# Patient Record
Sex: FEMALE | Race: BLACK OR AFRICAN AMERICAN
[De-identification: names, ages, dates, MRNs, and addresses within clinical notes are randomized per-mention and may not be internally consistent; named-entity substitution may affect disease eponyms.]

---

## 2017-07-21 ENCOUNTER — HOSPITAL ENCOUNTER (OUTPATIENT)
Dept: HOSPITAL 62 - WI | Age: 62
End: 2017-07-21
Attending: INTERNAL MEDICINE
Payer: MEDICARE

## 2017-07-21 DIAGNOSIS — Z12.31: Primary | ICD-10-CM

## 2017-07-21 PROCEDURE — G0202 SCR MAMMO BI INCL CAD: HCPCS

## 2017-07-21 PROCEDURE — 77067 SCR MAMMO BI INCL CAD: CPT

## 2017-07-21 NOTE — WOMENS IMAGING REPORT
EXAM DESCRIPTION:  BILAT SCREENING MAMMO W/CAD



COMPLETED DATE/TIME:  7/21/2017 2:13 pm



REASON FOR STUDY:  Z12.31, ROUTINE SCREENING MAMMO Z12.31  ENCNTR SCREEN MAMMOGRAM FOR MALIGNANT NEOP
LASM OF TERESE



COMPARISON:  October 2015 and August 2014



TECHNIQUE:  Standard craniocaudal and mediolateral oblique views of each breast recorded using digita
l acquisition.



LIMITATIONS:  None.



FINDINGS:  No masses, calcifications or architectural distortion. No areas of suspicion.

Read with the assistance of CAD.

[facility] Cenova Version 1.3

.[facility] - R2 Cenova Version 1.3

.[facility] - R2 Cenova Version 2.4

.[facility] - R2 Cenova Version 2.4

.[facility] - R2  Version 9.2



IMPRESSION:  NORMAL MAMMOGRAM.  BIRADS 1.



BREAST DENSITY:  a. The breasts are almost entirely fatty.



BIRAD:  1 NEGATIVE



RECOMMENDATION:  ROUTINE SCREENING



COMMENT:  The patient has been notified of the results by letter per MQSA requirements. Additional no
tification policies are in place for contacting patient with suspicious or incomplete findings.

Quality ID #225: The American College of Radiology recommends an annual screening mammogram for women
 aged 40 years or over. This facility utilizes a reminder system to ensure that all patients receive 
reminder letters, and/or direct phone calls for appointments. This includes reminders for routine scr
eening mammograms, diagnostic mammograms, or other Breast Imaging Interventions when appropriate.  Th
is patient will be placed in the appropriate reminder system.

The American College of Radiology (ACR) has developed recommendations for screening MRI of the breast
s in certain patient populations, to be used in conjunction with mammography.  Breast MRI surveillanc
e may be appropriate for women with more than 20% lifetime risk of developing breast cancer  as deter
mined by genetic testing, significant family history of the disease, or history of mantle radiation f
or Hodgkins Disease.  ACR Practice Guidelines 2008.



TECHNICAL DOCUMENTATION:  FINDING NUMBER: (1)

ASSESSMENT: (1)

JOB ID:  [id number]

 2011 [employer]- All Rights Reserved

## 2017-08-30 ENCOUNTER — HOSPITAL ENCOUNTER (OUTPATIENT)
Dept: HOSPITAL 62 - END | Age: 62
Discharge: HOME | End: 2017-08-30
Attending: SPECIALIST
Payer: MEDICARE

## 2017-08-30 VITALS — SYSTOLIC BLOOD PRESSURE: 142 MMHG | DIASTOLIC BLOOD PRESSURE: 86 MMHG

## 2017-08-30 DIAGNOSIS — K62.5: Primary | ICD-10-CM

## 2017-08-30 DIAGNOSIS — Z79.899: ICD-10-CM

## 2017-08-30 DIAGNOSIS — Z79.1: ICD-10-CM

## 2017-08-30 DIAGNOSIS — E66.01: ICD-10-CM

## 2017-08-30 DIAGNOSIS — J44.9: ICD-10-CM

## 2017-08-30 DIAGNOSIS — I10: ICD-10-CM

## 2017-08-30 DIAGNOSIS — Z88.5: ICD-10-CM

## 2017-08-30 DIAGNOSIS — D64.9: ICD-10-CM

## 2017-08-30 DIAGNOSIS — K57.30: ICD-10-CM

## 2017-08-30 LAB
BASOPHILS # BLD AUTO: 0.1 10^3/UL (ref 0–0.2)
BASOPHILS NFR BLD AUTO: 0.9 % (ref 0–2)
EOSINOPHIL # BLD AUTO: 0.1 10^3/UL (ref 0–0.6)
EOSINOPHIL NFR BLD AUTO: 2.4 % (ref 0–6)
ERYTHROCYTE [DISTWIDTH] IN BLOOD BY AUTOMATED COUNT: 15.6 % (ref 11.5–14)
FERRITIN SERPL-MCNC: 49.6 NG/ML (ref 11.1–264)
HCT VFR BLD CALC: 36.3 % (ref 36–47)
HGB BLD-MCNC: 12 G/DL (ref 12–15.5)
HGB HCT DIFFERENCE: -0.3
IRON SERPL-MCNC: 53 UG/DL (ref 37–170)
LYMPHOCYTES # BLD AUTO: 0.9 10^3/UL (ref 0.5–4.7)
LYMPHOCYTES NFR BLD AUTO: 16.1 % (ref 13–45)
MCH RBC QN AUTO: 28.8 PG (ref 27–33.4)
MCHC RBC AUTO-ENTMCNC: 33 G/DL (ref 32–36)
MCV RBC AUTO: 87 FL (ref 80–97)
MONOCYTES # BLD AUTO: 0.3 10^3/UL (ref 0.1–1.4)
MONOCYTES NFR BLD AUTO: 5.2 % (ref 3–13)
NEUTROPHILS # BLD AUTO: 4.2 10^3/UL (ref 1.7–8.2)
NEUTS SEG NFR BLD AUTO: 75.4 % (ref 42–78)
RBC # BLD AUTO: 4.15 10^6/UL (ref 3.72–5.28)
WBC # BLD AUTO: 5.5 10^3/UL (ref 4–10.5)

## 2017-08-30 PROCEDURE — 45378 DIAGNOSTIC COLONOSCOPY: CPT

## 2017-08-30 PROCEDURE — 36415 COLL VENOUS BLD VENIPUNCTURE: CPT

## 2017-08-30 PROCEDURE — 85025 COMPLETE CBC W/AUTO DIFF WBC: CPT

## 2017-08-30 PROCEDURE — 83540 ASSAY OF IRON: CPT

## 2017-08-30 PROCEDURE — 82728 ASSAY OF FERRITIN: CPT

## 2017-08-30 PROCEDURE — 0DJ08ZZ INSPECTION OF UPPER INTESTINAL TRACT, VIA NATURAL OR ARTIFICIAL OPENING ENDOSCOPIC: ICD-10-PCS | Performed by: SPECIALIST

## 2017-08-30 RX ADMIN — MIDAZOLAM HYDROCHLORIDE ONE MG: 1 INJECTION, SOLUTION INTRAMUSCULAR; INTRAVENOUS at 10:07

## 2017-08-30 RX ADMIN — FENTANYL CITRATE ONE MCG: 50 INJECTION INTRAMUSCULAR; INTRAVENOUS at 10:09

## 2017-08-30 RX ADMIN — FENTANYL CITRATE ONE MCG: 50 INJECTION INTRAMUSCULAR; INTRAVENOUS at 10:17

## 2017-08-30 RX ADMIN — MIDAZOLAM HYDROCHLORIDE ONE MG: 1 INJECTION, SOLUTION INTRAMUSCULAR; INTRAVENOUS at 10:24

## 2017-08-30 RX ADMIN — MIDAZOLAM HYDROCHLORIDE ONE MG: 1 INJECTION, SOLUTION INTRAMUSCULAR; INTRAVENOUS at 10:12

## 2017-08-30 RX ADMIN — MIDAZOLAM HYDROCHLORIDE ONE MG: 1 INJECTION, SOLUTION INTRAMUSCULAR; INTRAVENOUS at 10:15

## 2017-08-30 RX ADMIN — FENTANYL CITRATE ONE MCG: 50 INJECTION INTRAMUSCULAR; INTRAVENOUS at 10:13

## 2017-08-31 NOTE — OPERATIVE REPORT E
Operative Report



NAME: AIDA AMEZQUITA

MRN:  [id number]          : 1955 AGE:  61Y

DATE OF SURGERY: 2017              ROOM:



PREOPERATIVE DIAGNOSIS:

Rectal bleeding.



POSTOPERATIVE DIAGNOSIS:

Sigmoid and descending colon diverticulosis, mild.



PROCEDURE:

Colonoscopy.



SURGEON:

LOAN GRANDA M.D.



ANESTHESIA:

Versed 4 mg and Fentanyl 50 mcg.



TISSUE REMOVED OR ALTERED:

None.



PROCEDURE:

Rectal exam normal.  Sigmoid diverticulosis.  Descending colon

diverticulosis.  Transverse colon normal.  Ascending colon normal.  Cecum

normal.  Scope withdrawn from cecum, ascending, transverse, descending,

sigmoid all the way to the rectum.



CONCLUSIONS:

Sigmoid and descending colon diverticulosis.  No polyps.  No bleeding.



PLAN:

We will obtain baseline lab studies.  Consideration for follow-up

colonoscopy after 10 years.



DICTATING PHYSICIAN:  LOAN GRANDA M.D.





1209M                  DT: 2017    1055

PHY#: [id number]            DD: 2017    1039

ID:   [id number]           JOB#: [id number]       ACCT: [id number]



cc:LUIS GUERRERO M.D., MAHMOUD M.D.

>

## 2017-08-31 NOTE — HISTORY AND PHYSICAL E
History and Physical



NAME: PERSONAIDA

MRN:  [id number]             : 1955         AGE: 61Y

ADMITTED: 2017                      ROOM:

 



CHIEF COMPLAINT:

Blood in the stool, history of diverticulosis, history of benign polyp.



HISTORY:

We saw the patient in .  She did have diverticulosis and polyps. 

Because of rectal bleeding and strong family history of malignancy, the

patient is being admitted for colon exam.



PAST SURGICAL HISTORY:

1.  She did have cardiac cath.

2.  Cholecystectomy.



MEDICATIONS:

1.  Cymbalta.

2.  Celebrex.

3.  Clonidine.

4.  Simvastatin.

5.  Nexium.

6.  Diazepam.

7.  Losartan.

8.  Singulair.



PHYSICAL EXAMINATION:

GENERAL:  On exam, pleasant, alert, oriented, in no acute distress.



VITAL SIGNS:  Blood pressure is 120/80, pulse 80, respirations 20, temp is

98.



HEAD, EYES, EARS, NOSE, THROAT:  Normal.



ABDOMEN:  Soft.



NEUROLOGIC:  Exam negative.



CONCLUSION:

1.  Diverticulosis.

2.  Anemia.



PLAN:

Colon exam regarding rectal bleeding, diverticulosis.  Colonoscopy

scheduled .





DICTATING PHYSICIAN: LOAN GRANDA M.D.





1819M                  DT: 2017    1425

PHY#: [id number]            DD: 2017    1421

ID:   [id number]           JOB#: [id number]       ACCT: [id number]



cc:LOAN GRANDA M.D.

>

## 2017-08-31 NOTE — DISCHARGE SUMMARY E
Discharge Summary



NAME: AIDA AMEZQUITA

MRN:  [id number]        : 1955     AGE: 61Y

ADMITTED: 2017                  DISCHARGED: 2017



PROCEDURE:

Colonoscopy.



FINAL DIAGNOSIS:

Sigmoid diverticulosis.



HISTORY:

A 61-year-old female with rectal bleeding.  Today's colonoscopy was

successful to the cecum.  I did not see any bleeding.  No polyps.  Mild

diverticulosis sigmoid descending colon.



DISCHARGE PLAN:

Lab studies.  Soft diet.  Consideration follow up 10 years.





DICTATING PHYSICIAN:  LOAN GRANDA M.D.





1654M                  DT: 2017    1057

PHY#: [id number]            DD: 2017    1040

ID:   [id number]           JOB#: [id number]       ACCT: [id number]



cc:LUIS GUERRERO M.D., MAHMOUD M.D.

>

## 2017-08-31 NOTE — HISTORY AND PHYSICAL E
History and Physical



NAME: PERSONAIDA

MRN:  [id number]       : 1955   AGE: 61Y

ADMITTED: 2017                    ROOM:

 



CHIEF COMPLAINT:

The patient is a 61-year-old female with chief complaint of rectal

bleeding.



ALLERGIES:

CODEINE.



MEDICATIONS:

1.   Catapres.

2.   _______.

3.   Hydrocodone.

4.   Tylenol.

5.   Hyzaar.

6.   Mobic.

7.   Nitroglycerin.

8.   Topamax.



REVIEW OF SYSTEMS:

The patient has morbid obesity, hypertensive.



PHYSICAL EXAMINATION:

GENERAL:  Pleasant, alert and oriented in no acute distress.



VITAL SIGNS:  Temp is 98, pulse 80, respirations 18.



HEAD, EYES, EARS, NOSE AND THROAT:  Normal.



ABDOMEN:  Obese.



EXTREMITIES:  No edema.



CONCLUSIONS:

Rectal bleeding.



PLAN:

Colonoscopy.







DICTATING PHYSICIAN: LOAN GRANDA M.D.





1221M                  DT: 2017    1155

PHY#: [id number]            DD: 2017    1043

ID:   [id number]           JOB#: [id number]       ACCT: [id number]



cc:LOAN GRANDA M.D.

>

## 2018-03-27 ENCOUNTER — HOSPITAL ENCOUNTER (OUTPATIENT)
Dept: HOSPITAL 62 - SP | Age: 63
End: 2018-03-27
Attending: PHYSICIAN ASSISTANT
Payer: MEDICARE

## 2018-03-27 DIAGNOSIS — R06.01: Primary | ICD-10-CM

## 2018-03-27 DIAGNOSIS — R60.0: ICD-10-CM

## 2018-03-27 PROCEDURE — 93306 TTE W/DOPPLER COMPLETE: CPT

## 2018-03-27 NOTE — XCELERA REPORT
[address] [address]

                             Tel: [phone]

                             Fax: [phone]



                    Transthoracic Echocardiogram Report

____________________________________________________________________________



Name: PERSONAIDA

MRN: [id number]                Age: 62 yrs

Gender: Female                 : 1955

Patient Status: Outpatient     Patient Location: 

Account #: [id number]

Study Date: 2018 01:26 PM

Accession #: [id number]

____________________________________________________________________________



Height: 65 in        Weight: 301 lb        BSA: 2.4 m2



____________________________________________________________________________



Reason For Study: ORTHOPENA, EDEMA





Ordering Physician: STEVE DAS

Performed By: Ana Dang

____________________________________________________________________________





Interpretation Summary

Technically very poor study due to pt 301# and 65".

PLAX was poor, SAX A$C, and A2C views cannot see endocardial definition to

assess biplane LVEF or regional wall motion abnormality. Will need STEPHANIE to

further assess this pt. The following observations are made :

Increase peak AV velocity suggests mild AS, but AVnot visualised WRT

configuratioin, and uncertain if AR.

Mod Mitral annular calcification with no MS and no MVP and no MR seen and

Normal LA size.

No LVH , LVEF visually normal, and there is stage I LVDD by Mitral doppler.

Unable to eval LV segments.

RH poorly visualised and no measurable pulm hypertension RVSP may be 26 mm

Hg.



____________________________________________________________________________



MMode/2D Measurements & Calculations

RVDd: 2.3 cm  LVIDd: 5.2 cm   FS: 42.6 %            Ao root diam: 2.7 cm

IVSd: 0.90 cm LVIDs: 3.0 cm   EDV(Teich): 129.9 ml

              LVPWd: 0.93 cm  ESV(Teich): 34.6 ml   Ao root area: 5.8 cm2

                              EF(Teich): 73.3 %     LA dimension: 3.4 cm



Doppler Measurements & Calculations

MV E max cierra:      MV P1/2t max cierra:    Ao V2 max:        LV V1 max P.4 cm/sec        83.9 cm/sec          187.9 cm/sec      3.5 mmHg

MV A max cierra:      MV P1/2t: 72.3 msec  Ao max PG:        LV V1 max:

91.1 cm/sec                             14.1 mmHg         93.8 cm/sec

MV E/A: 0.93       MVA(P1/2t): 3.0 cm2

                   MV dec slope:

                   340.1 cm/sec2



        _____________________________________________________________

PA V2 max:         TR max cierra:

76.0 cm/sec        238.9 cm/sec

PA max PG:         TR max P.8 mmHg

2.3 mmHg

____________________________________________________________________________





Mitral Valve

There is moderate mitral annular calcification.



Effusions

Minimal pericardial effusion.









I      WMSI = 1.00     % Normal = 100

































                                                                Segments  Size

X - Cannot                2 -                      4 -          1-2     small

Interpret    1 - Normal   Hypokinetic  3 - AkineticDyskinetic   3-5     moderate

5 -                                                             6-14    large

Aneurysmal                                                      15-16   diffuse







____________________________________________________________________________

Electronically signed by:      Vaughn Lieberman      on 2018 07:46 PM



CC: STEVE DAS

>

Vaughn Lieberman

## 2018-04-23 ENCOUNTER — HOSPITAL ENCOUNTER (OUTPATIENT)
Dept: HOSPITAL 62 - RAD | Age: 63
End: 2018-04-23
Attending: PHYSICIAN ASSISTANT
Payer: MEDICARE

## 2018-04-23 DIAGNOSIS — R94.5: Primary | ICD-10-CM

## 2018-04-23 PROCEDURE — 76705 ECHO EXAM OF ABDOMEN: CPT

## 2018-08-02 ENCOUNTER — HOSPITAL ENCOUNTER (OUTPATIENT)
Dept: HOSPITAL 62 - WI | Age: 63
End: 2018-08-02
Attending: PHYSICIAN ASSISTANT
Payer: MEDICARE

## 2018-08-02 DIAGNOSIS — Z12.31: Primary | ICD-10-CM

## 2018-08-02 PROCEDURE — 77067 SCR MAMMO BI INCL CAD: CPT

## 2018-08-02 PROCEDURE — 77063 BREAST TOMOSYNTHESIS BI: CPT

## 2018-08-02 NOTE — WOMENS IMAGING REPORT
EXAM DESCRIPTION:  3D SCREENING MAMMO BILAT



COMPLETED DATE/TIME:  8/2/2018 1:15 pm



REASON FOR STUDY:  SCREENING MAMMO Z12.31  ENCNTR SCREEN MAMMOGRAM FOR MALIGNANT NEOPLASM OF TERESE



COMPARISON:  Multiple since 2012



TECHNIQUE:  Standard craniocaudal and mediolateral oblique views of each breast recorded using digita
l acquisition and breast tomosynthesis.



LIMITATIONS:  None.



FINDINGS:  No masses, calcifications or architectural distortion. No areas of suspicion.

Read with the assistance of CAD.

[facility] Cenova Version 1.3

.[facility] - R2 Cenova Version 1.3

.[facility] - R2 Cenova Version 2.4

.[facility] - R2 Cenova Version 2.4

.[facility] - R2  Version 9.2



IMPRESSION:  NORMAL MAMMOGRAM.  BIRADS 1.



BREAST DENSITY:  a. The breasts are almost entirely fatty.



BIRAD:  1 NEGATIVE



RECOMMENDATION:  ROUTINE SCREENING

Please continue yearly bilateral screening tomosynthesis in August 2019



COMMENT:  The patient has been notified of the results by letter per SA requirements. Additional no
tification policies are in place for contacting patient with suspicious or incomplete findings.

Quality ID #225: The American College of Radiology recommends an annual screening mammogram for women
 aged 40 years or over. This facility utilizes a reminder system to ensure that all patients receive 
reminder letters, and/or direct phone calls for appointments. This includes reminders for routine scr
eening mammograms, diagnostic mammograms, or other Breast Imaging Interventions when appropriate.  Th
is patient will be placed in the appropriate reminder system.

The American College of Radiology (ACR) has developed recommendations for screening MRI of the breast
s in certain patient populations, to be used in conjunction with mammography.  Breast MRI surveillanc
e may be appropriate for women with more than 20% lifetime risk of developing breast cancer  as deter
mined by genetic testing, significant family history of the disease, or history of mantle radiation f
or Hodgkins Disease.  ACR Practice Guidelines 2008.

[employer]



PQRS 6045F: Fluoroscopic imaging is not utilized for breast tomosynthesis.



TECHNICAL DOCUMENTATION:  FINDING NUMBER: (1)

ASSESSMENT:  (1)

JOB ID:  [id number]

 2011 [employer]- All Rights Reserved



Reading location - IP/workstation name: [facility] hospital-RR2

## 2020-01-13 ENCOUNTER — HOSPITAL ENCOUNTER (OUTPATIENT)
Dept: HOSPITAL 62 - OD | Age: 65
End: 2020-01-13
Attending: PHYSICIAN ASSISTANT
Payer: MEDICARE

## 2020-01-13 DIAGNOSIS — R05: Primary | ICD-10-CM

## 2020-01-13 PROCEDURE — 71046 X-RAY EXAM CHEST 2 VIEWS: CPT

## 2020-01-13 NOTE — RADIOLOGY REPORT (SQ)
EXAM DESCRIPTION:  CHEST PA/LATERAL



COMPLETED DATE/TIME:  1/13/2020 1:29 pm



REASON FOR STUDY:  COUGH



COMPARISON:  3/24/2015



EXAM PARAMETERS:  NUMBER OF VIEWS: two views

TECHNIQUE: Digital Frontal and Lateral radiographic views of the chest acquired.

RADIATION DOSE: NA

LIMITATIONS: none



FINDINGS:  LUNGS AND PLEURA: No opacities, masses or pneumothorax. No pleural effusion.

MEDIASTINUM AND HILAR STRUCTURES: No masses or contour abnormalities.

HEART AND VASCULAR STRUCTURES: Heart normal size.  No evidence for failure.

BONES: No acute findings.

HARDWARE: None in the chest.

OTHER: No other significant finding.



IMPRESSION:  NO SIGNIFICANT RADIOGRAPHIC FINDING IN THE CHEST.



TECHNICAL DOCUMENTATION:  JOB ID:  [id number]

 2011 [employer]- All Rights Reserved



Reading location - IP/workstation name: [email or web address]

## 2020-10-28 ENCOUNTER — HOSPITAL ENCOUNTER (OUTPATIENT)
Dept: HOSPITAL 62 - WI | Age: 65
End: 2020-10-28
Attending: PHYSICIAN ASSISTANT
Payer: MEDICARE

## 2020-10-28 DIAGNOSIS — Z12.31: Primary | ICD-10-CM

## 2020-10-28 PROCEDURE — 77067 SCR MAMMO BI INCL CAD: CPT

## 2020-10-28 NOTE — WOMENS IMAGING REPORT
EXAM DESCRIPTION:  BILAT SCREENING MAMMO W/CAD



IMAGES COMPLETED DATE/TIME:  10/28/2020 9:48 am



REASON FOR STUDY:  Z12.31 ENCNTR SCREEN MAMMOGRAM FOR MALIGNANT NEOPLASM OF BREAST Z12.31  ENCNTR SCR
EEN MAMMOGRAM FOR MALIGNANT NEOPLASM OF TERESE



COMPARISON:  8/2/2018 and 7/21/2017.



EXAM PARAMETERS:  Standard craniocaudal and mediolateral oblique views of each breast recorded using 
digital acquisition.

Read with the assistance of CAD.

.[facility] - [employer]  Version 9.2



LIMITATIONS:  None.



FINDINGS:  No suspicious masses, suspicious calcifications or architectural distortion. No areas of c
oncern.



IMPRESSION:  NEGATIVE MAMMOGRAM.  BIRADS 1



BREAST DENSITY:  a. The breasts are almost entirely fatty.



BIRAD:  ASSESSMENT:  1 NEGATIVE



RECOMMENDATION:  ROUTINE SCREENING



COMMENT:  The patient has been notified of the results by letter per MQSA requirements. Additional no
tification policies are in place for contacting patient with suspicious or incomplete findings.

Quality ID #225: The American College of Radiology recommends an annual screening mammogram for women
 aged 40 years or over. This facility utilizes a reminder system to ensure that all patients receive 
reminder letters, and/or direct phone calls for appointments. This includes reminders for routine scr
eening mammograms, diagnostic mammograms, or other Breast Imaging Interventions when appropriate.  Th
is patient will be placed in the appropriate reminder system.



TECHNICAL DOCUMENTATION:  FINDING NUMBER: (1)

ASSESSMENT: (1)

JOB ID:  [id number]

 2011 [employer]- All Rights Reserved



Reading location - IP/workstation name: [email or web address]